# Patient Record
Sex: MALE | Race: BLACK OR AFRICAN AMERICAN | NOT HISPANIC OR LATINO | URBAN - METROPOLITAN AREA
[De-identification: names, ages, dates, MRNs, and addresses within clinical notes are randomized per-mention and may not be internally consistent; named-entity substitution may affect disease eponyms.]

---

## 2017-09-19 ENCOUNTER — EMERGENCY (EMERGENCY)
Facility: HOSPITAL | Age: 47
LOS: 1 days | Discharge: HOME | End: 2017-09-19
Attending: EMERGENCY MEDICINE | Admitting: EMERGENCY MEDICINE
Payer: COMMERCIAL

## 2017-09-19 VITALS
WEIGHT: 229.94 LBS | SYSTOLIC BLOOD PRESSURE: 157 MMHG | RESPIRATION RATE: 16 BRPM | OXYGEN SATURATION: 96 % | TEMPERATURE: 98 F | HEIGHT: 66 IN | DIASTOLIC BLOOD PRESSURE: 100 MMHG | HEART RATE: 92 BPM

## 2017-09-19 VITALS
SYSTOLIC BLOOD PRESSURE: 154 MMHG | OXYGEN SATURATION: 97 % | HEART RATE: 82 BPM | RESPIRATION RATE: 18 BRPM | DIASTOLIC BLOOD PRESSURE: 101 MMHG | TEMPERATURE: 98 F

## 2017-09-19 DIAGNOSIS — R07.89 OTHER CHEST PAIN: ICD-10-CM

## 2017-09-19 DIAGNOSIS — Z79.02 LONG TERM (CURRENT) USE OF ANTITHROMBOTICS/ANTIPLATELETS: ICD-10-CM

## 2017-09-19 DIAGNOSIS — Z79.4 LONG TERM (CURRENT) USE OF INSULIN: ICD-10-CM

## 2017-09-19 DIAGNOSIS — I10 ESSENTIAL (PRIMARY) HYPERTENSION: ICD-10-CM

## 2017-09-19 DIAGNOSIS — E11.65 TYPE 2 DIABETES MELLITUS WITH HYPERGLYCEMIA: ICD-10-CM

## 2017-09-19 DIAGNOSIS — Z79.899 OTHER LONG TERM (CURRENT) DRUG THERAPY: ICD-10-CM

## 2017-09-19 DIAGNOSIS — Z98.61 CORONARY ANGIOPLASTY STATUS: Chronic | ICD-10-CM

## 2017-09-19 DIAGNOSIS — E78.00 PURE HYPERCHOLESTEROLEMIA, UNSPECIFIED: ICD-10-CM

## 2017-09-19 LAB
ALBUMIN SERPL ELPH-MCNC: 4.3 G/DL — SIGNIFICANT CHANGE UP (ref 3.3–5)
ALP SERPL-CCNC: 108 U/L — SIGNIFICANT CHANGE UP (ref 40–120)
ALT FLD-CCNC: 78 U/L — HIGH (ref 10–45)
ANION GAP SERPL CALC-SCNC: 16 MMOL/L — SIGNIFICANT CHANGE UP (ref 5–17)
APTT BLD: 29.4 SEC — SIGNIFICANT CHANGE UP (ref 27.5–37.4)
AST SERPL-CCNC: 49 U/L — HIGH (ref 10–40)
B-OH-BUTYR SERPL-SCNC: 0.2 MMOL/L — SIGNIFICANT CHANGE UP
BASE EXCESS BLDV CALC-SCNC: -0.8 MMOL/L — SIGNIFICANT CHANGE UP
BASOPHILS NFR BLD AUTO: 0.1 % — SIGNIFICANT CHANGE UP (ref 0–2)
BILIRUB SERPL-MCNC: 0.6 MG/DL — SIGNIFICANT CHANGE UP (ref 0.2–1.2)
BUN SERPL-MCNC: 19 MG/DL — SIGNIFICANT CHANGE UP (ref 7–23)
CALCIUM SERPL-MCNC: 10.1 MG/DL — SIGNIFICANT CHANGE UP (ref 8.4–10.5)
CHLORIDE SERPL-SCNC: 95 MMOL/L — LOW (ref 96–108)
CK MB CFR SERPL CALC: 2.8 NG/ML — SIGNIFICANT CHANGE UP (ref 0–6.7)
CO2 SERPL-SCNC: 21 MMOL/L — LOW (ref 22–31)
CREAT SERPL-MCNC: 1.05 MG/DL — SIGNIFICANT CHANGE UP (ref 0.5–1.3)
EOSINOPHIL NFR BLD AUTO: 0.7 % — SIGNIFICANT CHANGE UP (ref 0–6)
GAS PNL BLDV: SIGNIFICANT CHANGE UP
GLUCOSE SERPL-MCNC: 459 MG/DL — CRITICAL HIGH (ref 70–99)
HCO3 BLDV-SCNC: 25 MMOL/L — SIGNIFICANT CHANGE UP (ref 20–27)
HCT VFR BLD CALC: 45.6 % — SIGNIFICANT CHANGE UP (ref 39–50)
HGB BLD-MCNC: 15.8 G/DL — SIGNIFICANT CHANGE UP (ref 13–17)
INR BLD: 0.88 — SIGNIFICANT CHANGE UP (ref 0.88–1.16)
LYMPHOCYTES # BLD AUTO: 36 % — SIGNIFICANT CHANGE UP (ref 13–44)
MCHC RBC-ENTMCNC: 30.4 PG — SIGNIFICANT CHANGE UP (ref 27–34)
MCHC RBC-ENTMCNC: 34.6 G/DL — SIGNIFICANT CHANGE UP (ref 32–36)
MCV RBC AUTO: 87.7 FL — SIGNIFICANT CHANGE UP (ref 80–100)
MONOCYTES NFR BLD AUTO: 5.5 % — SIGNIFICANT CHANGE UP (ref 2–14)
NEUTROPHILS NFR BLD AUTO: 57.7 % — SIGNIFICANT CHANGE UP (ref 43–77)
PCO2 BLDV: 46 MMHG — SIGNIFICANT CHANGE UP (ref 41–51)
PH BLDV: 7.36 — SIGNIFICANT CHANGE UP (ref 7.32–7.43)
PLATELET # BLD AUTO: 209 K/UL — SIGNIFICANT CHANGE UP (ref 150–400)
PO2 BLDV: 33 MMHG — SIGNIFICANT CHANGE UP
POTASSIUM SERPL-MCNC: 5.1 MMOL/L — SIGNIFICANT CHANGE UP (ref 3.5–5.3)
POTASSIUM SERPL-SCNC: 5.1 MMOL/L — SIGNIFICANT CHANGE UP (ref 3.5–5.3)
PROT SERPL-MCNC: 7.9 G/DL — SIGNIFICANT CHANGE UP (ref 6–8.3)
PROTHROM AB SERPL-ACNC: 9.7 SEC — LOW (ref 9.8–12.7)
RBC # BLD: 5.2 M/UL — SIGNIFICANT CHANGE UP (ref 4.2–5.8)
RBC # FLD: 12.9 % — SIGNIFICANT CHANGE UP (ref 10.3–16.9)
SAO2 % BLDV: 58 % — SIGNIFICANT CHANGE UP
SODIUM SERPL-SCNC: 132 MMOL/L — LOW (ref 135–145)
TROPONIN T SERPL-MCNC: <0.01 NG/ML — SIGNIFICANT CHANGE UP (ref 0–0.01)
WBC # BLD: 6.7 K/UL — SIGNIFICANT CHANGE UP (ref 3.8–10.5)
WBC # FLD AUTO: 6.7 K/UL — SIGNIFICANT CHANGE UP (ref 3.8–10.5)

## 2017-09-19 PROCEDURE — 99284 EMERGENCY DEPT VISIT MOD MDM: CPT | Mod: 25

## 2017-09-19 PROCEDURE — 71020: CPT | Mod: 26

## 2017-09-19 PROCEDURE — 85025 COMPLETE CBC W/AUTO DIFF WBC: CPT

## 2017-09-19 PROCEDURE — 96374 THER/PROPH/DIAG INJ IV PUSH: CPT

## 2017-09-19 PROCEDURE — 84484 ASSAY OF TROPONIN QUANT: CPT

## 2017-09-19 PROCEDURE — 85730 THROMBOPLASTIN TIME PARTIAL: CPT

## 2017-09-19 PROCEDURE — 82803 BLOOD GASES ANY COMBINATION: CPT

## 2017-09-19 PROCEDURE — 85610 PROTHROMBIN TIME: CPT

## 2017-09-19 PROCEDURE — 82553 CREATINE MB FRACTION: CPT

## 2017-09-19 PROCEDURE — 82010 KETONE BODYS QUAN: CPT

## 2017-09-19 PROCEDURE — 85379 FIBRIN DEGRADATION QUANT: CPT

## 2017-09-19 PROCEDURE — 99285 EMERGENCY DEPT VISIT HI MDM: CPT | Mod: 25

## 2017-09-19 PROCEDURE — 36415 COLL VENOUS BLD VENIPUNCTURE: CPT

## 2017-09-19 PROCEDURE — 82550 ASSAY OF CK (CPK): CPT

## 2017-09-19 PROCEDURE — 71046 X-RAY EXAM CHEST 2 VIEWS: CPT

## 2017-09-19 PROCEDURE — 80053 COMPREHEN METABOLIC PANEL: CPT

## 2017-09-19 PROCEDURE — 93010 ELECTROCARDIOGRAM REPORT: CPT

## 2017-09-19 PROCEDURE — 93005 ELECTROCARDIOGRAM TRACING: CPT

## 2017-09-19 RX ORDER — SODIUM CHLORIDE 9 MG/ML
1000 INJECTION INTRAMUSCULAR; INTRAVENOUS; SUBCUTANEOUS ONCE
Qty: 0 | Refills: 0 | Status: COMPLETED | OUTPATIENT
Start: 2017-09-19 | End: 2017-09-19

## 2017-09-19 RX ORDER — INSULIN HUMAN 100 [IU]/ML
8 INJECTION, SOLUTION SUBCUTANEOUS ONCE
Qty: 0 | Refills: 0 | Status: COMPLETED | OUTPATIENT
Start: 2017-09-19 | End: 2017-09-19

## 2017-09-19 RX ORDER — ASPIRIN/CALCIUM CARB/MAGNESIUM 324 MG
325 TABLET ORAL ONCE
Qty: 0 | Refills: 0 | Status: COMPLETED | OUTPATIENT
Start: 2017-09-19 | End: 2017-09-19

## 2017-09-19 RX ORDER — METOPROLOL TARTRATE 50 MG
25 TABLET ORAL ONCE
Qty: 0 | Refills: 0 | Status: COMPLETED | OUTPATIENT
Start: 2017-09-19 | End: 2017-09-19

## 2017-09-19 RX ADMIN — INSULIN HUMAN 8 UNIT(S): 100 INJECTION, SOLUTION SUBCUTANEOUS at 10:50

## 2017-09-19 RX ADMIN — Medication 25 MILLIGRAM(S): at 09:41

## 2017-09-19 RX ADMIN — SODIUM CHLORIDE 2000 MILLILITER(S): 9 INJECTION INTRAMUSCULAR; INTRAVENOUS; SUBCUTANEOUS at 11:57

## 2017-09-19 RX ADMIN — SODIUM CHLORIDE 2000 MILLILITER(S): 9 INJECTION INTRAMUSCULAR; INTRAVENOUS; SUBCUTANEOUS at 10:50

## 2017-09-19 RX ADMIN — Medication 325 MILLIGRAM(S): at 09:41

## 2017-09-19 NOTE — ED PROVIDER NOTE - MEDICAL DECISION MAKING DETAILS
Pt w h/o cad s/p nstemi, stents, + fh cad and pe/dvt c/o R sided cp.  Sx intermittent x 1 yr, pleuritic today.  EKG w/o stemi.  Sx seem atypical for acs but ? acs.  + pe risk (travel, fh) but no sob, tachypnea, ? costochondritis or plueritis given recent uri, no radiation to back, sx x 1 yr - doubt dissection despite elevated bp today.  Plan labs, cxr, reassess.  Pt cp free in ed.

## 2017-09-19 NOTE — ED ADULT TRIAGE NOTE - OTHER COMPLAINTS
pt c.o R sided cp, intermittent, no radiation x 1 year. hx cardiac stents 2015. denies sob. cp began this am. ekg in progress.

## 2017-09-19 NOTE — ED PROVIDER NOTE - DIAGNOSTIC INTERPRETATION
ER Physician:  Bren Director  CHEST XRAY INTERPRETATION: lungs clear, heart shadow normal, bony structures intact

## 2017-09-19 NOTE — ED PROVIDER NOTE - PMH
DM (diabetes mellitus)    HTN (hypertension)    Hypercholesteremia    NSTEMI (non-ST elevated myocardial infarction)    Stented coronary artery  x 8

## 2017-09-19 NOTE — ED PROVIDER NOTE - CARDIAC, MLM
Normal rate, regular rhythm.  Heart sounds S1, S2.  No murmurs, rubs or gallops. + R cw and costosternal area ttp that reproduces cc

## 2017-09-19 NOTE — ED PROVIDER NOTE - OBJECTIVE STATEMENT
44 y/o M w/ Strong FMHX of CAD/MI, Ex-Smoker (quit in 3/2016), PMHX of HTN, DM, h/o multiple syncope episodes between 7238-4184, CAD s/p NSTEMI at [facility] in 3/2016 44 y/o M w/ Strong FMHX of CAD/MI, Ex-Smoker (quit in 3/2016), PMHX of HTN, DM, h/o multiple syncope episodes between 9897-3757, CAD s/p NSTEMI at [facility] in 3/2016, s/p cath w mult stents 3/16 c/o R sided cp x 1 yr intermittently, woke w pain this am at 530, intermittent pain - felt w deep inspiration until 8 am, now gone.  No associated sob, palpitations, n, dizziness, + diaphoresis, no le pain/swelling, + recent car trip to [place], + fh cad and pe/dvt, no personal h/o pe/dvt, + recent uri sx last week w slight cough/fever now resolved.  Pain "feels like a hole" and is different than prev mi related pain (indigestion sensation, not present today, no abd pain, n/v).  Pain 7/10 when present.  Pt reports he took his meds, including bp meds, today.  No radiation to back or arm.  No recent lift/strain, associated numbness/weakness in ext, ha.  Pt reports nl stress test within the past yr, no recent exertional cp/sob.

## 2017-09-19 NOTE — ED PROVIDER NOTE - MUSCULOSKELETAL, MLM
bilat le w/o ttp, c/c/e, cord; Spine appears normal, range of motion is not limited, no muscle or joint tenderness

## 2017-09-19 NOTE — ED PROVIDER NOTE - PROGRESS NOTE DETAILS
Pt discussed w his cardiologist, Dr Pelayo - clemente ramirez for cardiac, he can fu w Dr Pelayo in the office on Fri.  Pt's glu elevated (did not take his Victoza today) - will give insulin and ivf, vbg w nl pH, Beta hydroxybuturate pending.  Plan on dc if glu improved.

## 2017-11-16 VITALS
WEIGHT: 225.97 LBS | HEIGHT: 66 IN | HEART RATE: 80 BPM | SYSTOLIC BLOOD PRESSURE: 151 MMHG | DIASTOLIC BLOOD PRESSURE: 101 MMHG | OXYGEN SATURATION: 99 % | TEMPERATURE: 98 F | RESPIRATION RATE: 16 BRPM

## 2017-11-16 NOTE — H&P ADULT - NSHPLABSRESULTS_GEN_ALL_CORE
15.7   8.6   )-----------( 216      ( 17 Nov 2017 14:30 )             44.4 15.7   8.6   )-----------( 216      ( 17 Nov 2017 14:30 )             44.4  11-17    139  |  98  |  12  ----------------------------<  215<H>  4.0   |  23  |  1.01    Ca    9.8      17 Nov 2017 14:30    TPro  7.9  /  Alb  4.6  /  TBili  0.6  /  DBili  <0.2  /  AST  24  /  ALT  48<H>  /  AlkPhos  86  11-17  PT/INR - ( 17 Nov 2017 14:30 )   PT: 10.6 sec;   INR: 0.96          PTT - ( 17 Nov 2017 14:30 )  PTT:29.6 sec

## 2017-11-16 NOTE — H&P ADULT - FAMILY HISTORY
Father  Still living? No  Family history of acute myocardial infarction, Age at diagnosis: 41-50     Sibling  Still living? Unknown  Family history of acute myocardial infarction, Age at diagnosis: 41-50     Aunt  Still living? Unknown  Family history of acute myocardial infarction, Age at diagnosis: Age Unknown     Uncle  Still living? Unknown  Family history of acute myocardial infarction, Age at diagnosis: Age Unknown

## 2017-11-16 NOTE — H&P ADULT - HISTORY OF PRESENT ILLNESS
SKELETON    Patient is a 46yo M, former smoker, wtih Strong FHx of CAD/MI and PMHX of HTN, IDDM, h/o multiple syncope episodes between 8740-2490, CAD s/p NSTEMI 3/2016 tx'd PCI LAD who presented to his cardiologist's office complaining of pressure-like left sided CP radiating to the left arm and jaw with associated SOB on exertion, relieved with rest. Patient denies palpitations, orthopnea, PND, N/V, hematochezia, melena, LE edema, dizziness, syncope. Patient underwent stress echo which was abnormal as per Dr. Pelayo's note (report pending). In light of patient's risk factors and class III anginal symptoms, patient is now referred to [facility] for recommended cardiac catheterization with possible intervention. **PLEASE CLARIFY SMOKING HISTORY***    46yo M, former smoker, with Strong FHx of CAD/MI and PMHX of HTN, IDDM, h/o multiple syncope episodes between 9925-2778 (none for past 5 years), CAD s/p NSTEMI 3/2016 tx'd PCI LAD who presented to his cardiologist's office complaining of  intermittent CP over past year that has worsened over past 2.5 weeks. Worsening CP described as continuous right sided chest pain radiating down right arm and of 8/10 intensity occurring independent of activity.  Muscle relaxers and Motrin prescribed 4 days ago and CP has since resolved. Furthermore, he endorses orthopnea (sleeps with 2 pillows) and PND since previous NSTEMI 3/2016.  Patient denies palpitations,  N/V,  LE edema, dizziness, recent episodes syncope. Patient underwent stress echo which was abnormal as per Dr. Pelayo's note (report pending). In light of patient's risk factors and class III anginal symptoms, patient is now referred to [facility] for recommended cardiac catheterization with possible intervention. 46yo M, former smoker, with Strong FHx of CAD/MI and PMHX of HTN, IDDM, h/o multiple syncope episodes between 7436-2314 (none for past 5 years), CAD s/p NSTEMI 3/2016 tx'd PCI LAD who presented to his cardiologist's office complaining of  intermittent CP over past year that has worsened over past 2.5 weeks. Worsening CP described as continuous right sided chest pain radiating down right arm and of 8/10 intensity occurring independent of activity.  Muscle relaxers and Motrin prescribed 4 days ago and CP has since resolved. Furthermore, he endorses orthopnea (sleeps with 2 pillows) and PND since previous NSTEMI 3/2016.  Patient denies palpitations,  N/V,  LE edema, dizziness, recent episodes syncope. Patient underwent stress echo 10/21/17 which revealed low- normal LVSF, LVEF 50-55 %; hypokinetic apical lateral LV wall motion, trace MR, trace TR and the test was stopped because of leg fatigue. In light of patient's risk factors and class III anginal symptoms, patient is now referred to [facility] for recommended cardiac catheterization with possible intervention.

## 2017-11-16 NOTE — H&P ADULT - ASSESSMENT
46yo M, former smoker, with Strong FHx of CAD/MI and PMHX of HTN, IDDM, h/o multiple syncope episodes between 4773-3858 (none for past 5 years), CAD s/p NSTEMI 3/2016 tx'd PCI LAD who presents for recommended cardiac catheterization with possible intervention secondary to patient's risk factors and class III anginal symptoms     mg X 1 and Plavix 75 mg X 1 given pre-cath. Pt. compliant with DAPT.  IV NS @ 75 cc/hr.    Risks & benefits of procedure and alternative therapy have been explained to the patient including but not limited to: allergic reaction, bleeding w/possible need for blood transfusion, infection, renal and vascular compromise, limb damage, arrhythmia, stroke, vessel dissection/perforation, Myocardial infarction, emergent CABG. Informed consent obtained and in chart. 44yo M, former smoker, with Strong FHx of CAD/MI and PMHX of HTN, IDDM, h/o multiple syncope episodes between 0213-5211 (none for past 5 years), CAD s/p NSTEMI 3/2016 tx'd PCI LAD who presents for recommended cardiac catheterization with possible intervention secondary to patient's risk factors and class III anginal symptoms     mg X 1 and Plavix 75 mg X 1 given pre-cath. Pt. compliant with DAPT.  IV NS @ 75 cc/hr.  Pt. BP on arrival 175/101. Current /101. Pt. given Metoprolol succinate 25 mg PO X 1.    Risks & benefits of procedure and alternative therapy have been explained to the patient including but not limited to: allergic reaction, bleeding w/possible need for blood transfusion, infection, renal and vascular compromise, limb damage, arrhythmia, stroke, vessel dissection/perforation, Myocardial infarction, emergent CABG. Informed consent obtained and in chart. 44yo M, former smoker, with Strong FHx of CAD/MI and PMHX of HTN, IDDM, h/o multiple syncope episodes between 9280-1898 (none for past 5 years), CAD s/p NSTEMI 3/2016 tx'd PCI LAD who presents for recommended cardiac catheterization with possible intervention secondary to patient's risk factors and class III anginal symptoms     mg X 1 and Plavix 75 mg X 1 given pre-cath. Pt. compliant with DAPT.  IV NS @ 75 cc/hr.  Pt. BP on arrival 175/101. Current /101. Pt. given Metoprolol succinate 25 mg PO X 1. Current /91.    Risks & benefits of procedure and alternative therapy have been explained to the patient including but not limited to: allergic reaction, bleeding w/possible need for blood transfusion, infection, renal and vascular compromise, limb damage, arrhythmia, stroke, vessel dissection/perforation, Myocardial infarction, emergent CABG. Informed consent obtained and in chart. 44yo M, former smoker, with Strong FHx of CAD/MI and PMHX of HTN, IDDM, h/o multiple syncope episodes between 4273-2202 (none for past 5 years), CAD s/p NSTEMI 3/2016 tx'd PCI LAD who presents for recommended cardiac catheterization with possible intervention secondary to patient's risk factors and class III anginal symptoms     mg X 1 and Plavix 75 mg X 1 given pre-cath. Pt. compliant with DAPT.  IV NS @ 75 cc/hr.  Pt. BP on arrival 175/101. Repeat /101. Pt. given Metoprolol succinate 25 mg PO X 1. Current /91.    Risks & benefits of procedure and alternative therapy have been explained to the patient including but not limited to: allergic reaction, bleeding w/possible need for blood transfusion, infection, renal and vascular compromise, limb damage, arrhythmia, stroke, vessel dissection/perforation, Myocardial infarction, emergent CABG. Informed consent obtained and in chart.

## 2017-11-16 NOTE — H&P ADULT - NSHPSOCIALHISTORY_GEN_ALL_CORE
Former smoker    Denies ETOH/elicit drug use. Former smoker; quit smoking 2 yrs ago; smoked 1 pack on and off for 36 years.    Denies ETOH/elicit drug use.

## 2017-11-16 NOTE — H&P ADULT - RS GEN PE MLT RESP DETAILS PC
normal/no rhonchi/no wheezes/airway patent/breath sounds equal/good air movement/no rales/respirations non-labored/clear to auscultation bilaterally

## 2017-11-17 ENCOUNTER — INPATIENT (INPATIENT)
Facility: HOSPITAL | Age: 47
LOS: 0 days | Discharge: HOME | DRG: 247 | End: 2017-11-18
Attending: INTERNAL MEDICINE | Admitting: INTERNAL MEDICINE
Payer: COMMERCIAL

## 2017-11-17 DIAGNOSIS — Z98.61 CORONARY ANGIOPLASTY STATUS: Chronic | ICD-10-CM

## 2017-11-17 LAB
ALBUMIN SERPL ELPH-MCNC: 4.6 G/DL — SIGNIFICANT CHANGE UP (ref 3.3–5)
ALBUMIN SERPL ELPH-MCNC: 4.6 G/DL — SIGNIFICANT CHANGE UP (ref 3.3–5)
ALP SERPL-CCNC: 86 U/L — SIGNIFICANT CHANGE UP (ref 40–120)
ALP SERPL-CCNC: 86 U/L — SIGNIFICANT CHANGE UP (ref 40–120)
ALT FLD-CCNC: 48 U/L — HIGH (ref 10–45)
ALT FLD-CCNC: 48 U/L — HIGH (ref 10–45)
ANION GAP SERPL CALC-SCNC: 18 MMOL/L — HIGH (ref 5–17)
APTT BLD: 29.6 SEC — SIGNIFICANT CHANGE UP (ref 27.5–37.4)
AST SERPL-CCNC: 24 U/L — SIGNIFICANT CHANGE UP (ref 10–40)
AST SERPL-CCNC: 24 U/L — SIGNIFICANT CHANGE UP (ref 10–40)
BASOPHILS NFR BLD AUTO: 0.2 % — SIGNIFICANT CHANGE UP (ref 0–2)
BILIRUB DIRECT SERPL-MCNC: <0.2 MG/DL — SIGNIFICANT CHANGE UP (ref 0–0.2)
BILIRUB INDIRECT FLD-MCNC: >0.4 MG/DL — SIGNIFICANT CHANGE UP (ref 0.2–1)
BILIRUB SERPL-MCNC: 0.6 MG/DL — SIGNIFICANT CHANGE UP (ref 0.2–1.2)
BILIRUB SERPL-MCNC: 0.6 MG/DL — SIGNIFICANT CHANGE UP (ref 0.2–1.2)
BUN SERPL-MCNC: 12 MG/DL — SIGNIFICANT CHANGE UP (ref 7–23)
CALCIUM SERPL-MCNC: 9.8 MG/DL — SIGNIFICANT CHANGE UP (ref 8.4–10.5)
CHLORIDE SERPL-SCNC: 98 MMOL/L — SIGNIFICANT CHANGE UP (ref 96–108)
CHOLEST SERPL-MCNC: 98 MG/DL — SIGNIFICANT CHANGE UP (ref 10–199)
CK MB CFR SERPL CALC: 2.7 NG/ML — SIGNIFICANT CHANGE UP (ref 0–6.7)
CO2 SERPL-SCNC: 23 MMOL/L — SIGNIFICANT CHANGE UP (ref 22–31)
CREAT SERPL-MCNC: 1.01 MG/DL — SIGNIFICANT CHANGE UP (ref 0.5–1.3)
EOSINOPHIL NFR BLD AUTO: 0.5 % — SIGNIFICANT CHANGE UP (ref 0–6)
GLUCOSE BLDC GLUCOMTR-MCNC: 158 MG/DL — HIGH (ref 70–99)
GLUCOSE BLDC GLUCOMTR-MCNC: 284 MG/DL — HIGH (ref 70–99)
GLUCOSE SERPL-MCNC: 215 MG/DL — HIGH (ref 70–99)
HBA1C BLD-MCNC: 10.7 % — HIGH (ref 4–5.6)
HCT VFR BLD CALC: 44.4 % — SIGNIFICANT CHANGE UP (ref 39–50)
HDLC SERPL-MCNC: 36 MG/DL — LOW (ref 40–125)
HGB BLD-MCNC: 15.7 G/DL — SIGNIFICANT CHANGE UP (ref 13–17)
INR BLD: 0.96 — SIGNIFICANT CHANGE UP (ref 0.88–1.16)
LIPID PNL WITH DIRECT LDL SERPL: 38 MG/DL — SIGNIFICANT CHANGE UP
LYMPHOCYTES # BLD AUTO: 38.3 % — SIGNIFICANT CHANGE UP (ref 13–44)
MCHC RBC-ENTMCNC: 30.8 PG — SIGNIFICANT CHANGE UP (ref 27–34)
MCHC RBC-ENTMCNC: 35.4 G/DL — SIGNIFICANT CHANGE UP (ref 32–36)
MCV RBC AUTO: 87.1 FL — SIGNIFICANT CHANGE UP (ref 80–100)
MONOCYTES NFR BLD AUTO: 5.1 % — SIGNIFICANT CHANGE UP (ref 2–14)
NEUTROPHILS NFR BLD AUTO: 55.9 % — SIGNIFICANT CHANGE UP (ref 43–77)
PLATELET # BLD AUTO: 216 K/UL — SIGNIFICANT CHANGE UP (ref 150–400)
POTASSIUM SERPL-MCNC: 4 MMOL/L — SIGNIFICANT CHANGE UP (ref 3.5–5.3)
POTASSIUM SERPL-SCNC: 4 MMOL/L — SIGNIFICANT CHANGE UP (ref 3.5–5.3)
PROT SERPL-MCNC: 7.9 G/DL — SIGNIFICANT CHANGE UP (ref 6–8.3)
PROT SERPL-MCNC: 7.9 G/DL — SIGNIFICANT CHANGE UP (ref 6–8.3)
PROTHROM AB SERPL-ACNC: 10.6 SEC — SIGNIFICANT CHANGE UP (ref 9.8–12.7)
RBC # BLD: 5.1 M/UL — SIGNIFICANT CHANGE UP (ref 4.2–5.8)
RBC # FLD: 13 % — SIGNIFICANT CHANGE UP (ref 10.3–16.9)
SODIUM SERPL-SCNC: 139 MMOL/L — SIGNIFICANT CHANGE UP (ref 135–145)
TOTAL CHOLESTEROL/HDL RATIO MEASUREMENT: 2.7 RATIO — LOW (ref 3.4–9.6)
TRIGL SERPL-MCNC: 120 MG/DL — SIGNIFICANT CHANGE UP (ref 10–149)
WBC # BLD: 8.6 K/UL — SIGNIFICANT CHANGE UP (ref 3.8–10.5)
WBC # FLD AUTO: 8.6 K/UL — SIGNIFICANT CHANGE UP (ref 3.8–10.5)

## 2017-11-17 PROCEDURE — 99222 1ST HOSP IP/OBS MODERATE 55: CPT | Mod: 25

## 2017-11-17 PROCEDURE — 93458 L HRT ARTERY/VENTRICLE ANGIO: CPT | Mod: 26,XU

## 2017-11-17 PROCEDURE — 93010 ELECTROCARDIOGRAM REPORT: CPT

## 2017-11-17 PROCEDURE — 92978 ENDOLUMINL IVUS OCT C 1ST: CPT | Mod: 26,LD

## 2017-11-17 PROCEDURE — 92933 PRQ TRLML C ATHRC ST ANGIOP1: CPT | Mod: LD

## 2017-11-17 RX ORDER — SODIUM CHLORIDE 9 MG/ML
1000 INJECTION, SOLUTION INTRAVENOUS
Qty: 0 | Refills: 0 | Status: DISCONTINUED | OUTPATIENT
Start: 2017-11-17 | End: 2017-11-17

## 2017-11-17 RX ORDER — CLOPIDOGREL BISULFATE 75 MG/1
75 TABLET, FILM COATED ORAL ONCE
Qty: 0 | Refills: 0 | Status: COMPLETED | OUTPATIENT
Start: 2017-11-17 | End: 2017-11-17

## 2017-11-17 RX ORDER — DEXTROSE 50 % IN WATER 50 %
1 SYRINGE (ML) INTRAVENOUS ONCE
Qty: 0 | Refills: 0 | Status: DISCONTINUED | OUTPATIENT
Start: 2017-11-17 | End: 2017-11-17

## 2017-11-17 RX ORDER — DEXTROSE 50 % IN WATER 50 %
1 SYRINGE (ML) INTRAVENOUS ONCE
Qty: 0 | Refills: 0 | Status: DISCONTINUED | OUTPATIENT
Start: 2017-11-17 | End: 2017-11-18

## 2017-11-17 RX ORDER — GLUCAGON INJECTION, SOLUTION 0.5 MG/.1ML
1 INJECTION, SOLUTION SUBCUTANEOUS ONCE
Qty: 0 | Refills: 0 | Status: DISCONTINUED | OUTPATIENT
Start: 2017-11-17 | End: 2017-11-18

## 2017-11-17 RX ORDER — DEXTROSE 50 % IN WATER 50 %
12.5 SYRINGE (ML) INTRAVENOUS ONCE
Qty: 0 | Refills: 0 | Status: DISCONTINUED | OUTPATIENT
Start: 2017-11-17 | End: 2017-11-17

## 2017-11-17 RX ORDER — INSULIN LISPRO 100/ML
VIAL (ML) SUBCUTANEOUS
Qty: 0 | Refills: 0 | Status: DISCONTINUED | OUTPATIENT
Start: 2017-11-17 | End: 2017-11-18

## 2017-11-17 RX ORDER — CHLORHEXIDINE GLUCONATE 213 G/1000ML
1 SOLUTION TOPICAL ONCE
Qty: 0 | Refills: 0 | Status: DISCONTINUED | OUTPATIENT
Start: 2017-11-17 | End: 2017-11-17

## 2017-11-17 RX ORDER — DEXTROSE 50 % IN WATER 50 %
25 SYRINGE (ML) INTRAVENOUS ONCE
Qty: 0 | Refills: 0 | Status: DISCONTINUED | OUTPATIENT
Start: 2017-11-17 | End: 2017-11-17

## 2017-11-17 RX ORDER — SODIUM CHLORIDE 9 MG/ML
1000 INJECTION, SOLUTION INTRAVENOUS
Qty: 0 | Refills: 0 | Status: DISCONTINUED | OUTPATIENT
Start: 2017-11-17 | End: 2017-11-18

## 2017-11-17 RX ORDER — DEXTROSE 50 % IN WATER 50 %
25 SYRINGE (ML) INTRAVENOUS ONCE
Qty: 0 | Refills: 0 | Status: DISCONTINUED | OUTPATIENT
Start: 2017-11-17 | End: 2017-11-18

## 2017-11-17 RX ORDER — ASPIRIN/CALCIUM CARB/MAGNESIUM 324 MG
325 TABLET ORAL ONCE
Qty: 0 | Refills: 0 | Status: COMPLETED | OUTPATIENT
Start: 2017-11-17 | End: 2017-11-17

## 2017-11-17 RX ORDER — ATORVASTATIN CALCIUM 80 MG/1
40 TABLET, FILM COATED ORAL AT BEDTIME
Qty: 0 | Refills: 0 | Status: DISCONTINUED | OUTPATIENT
Start: 2017-11-17 | End: 2017-11-18

## 2017-11-17 RX ORDER — DEXTROSE 50 % IN WATER 50 %
12.5 SYRINGE (ML) INTRAVENOUS ONCE
Qty: 0 | Refills: 0 | Status: DISCONTINUED | OUTPATIENT
Start: 2017-11-17 | End: 2017-11-18

## 2017-11-17 RX ORDER — ASPIRIN/CALCIUM CARB/MAGNESIUM 324 MG
81 TABLET ORAL DAILY
Qty: 0 | Refills: 0 | Status: DISCONTINUED | OUTPATIENT
Start: 2017-11-18 | End: 2017-11-18

## 2017-11-17 RX ORDER — INSULIN LISPRO 100/ML
VIAL (ML) SUBCUTANEOUS ONCE
Qty: 0 | Refills: 0 | Status: DISCONTINUED | OUTPATIENT
Start: 2017-11-17 | End: 2017-11-17

## 2017-11-17 RX ORDER — CLOPIDOGREL BISULFATE 75 MG/1
75 TABLET, FILM COATED ORAL DAILY
Qty: 0 | Refills: 0 | Status: DISCONTINUED | OUTPATIENT
Start: 2017-11-18 | End: 2017-11-18

## 2017-11-17 RX ORDER — INSULIN GLARGINE 100 [IU]/ML
25 INJECTION, SOLUTION SUBCUTANEOUS AT BEDTIME
Qty: 0 | Refills: 0 | Status: DISCONTINUED | OUTPATIENT
Start: 2017-11-17 | End: 2017-11-18

## 2017-11-17 RX ORDER — GLUCAGON INJECTION, SOLUTION 0.5 MG/.1ML
1 INJECTION, SOLUTION SUBCUTANEOUS ONCE
Qty: 0 | Refills: 0 | Status: DISCONTINUED | OUTPATIENT
Start: 2017-11-17 | End: 2017-11-17

## 2017-11-17 RX ORDER — INSULIN GLARGINE 100 [IU]/ML
25 INJECTION, SOLUTION SUBCUTANEOUS ONCE
Qty: 0 | Refills: 0 | Status: DISCONTINUED | OUTPATIENT
Start: 2017-11-17 | End: 2017-11-17

## 2017-11-17 RX ORDER — METOPROLOL TARTRATE 50 MG
25 TABLET ORAL ONCE
Qty: 0 | Refills: 0 | Status: COMPLETED | OUTPATIENT
Start: 2017-11-17 | End: 2017-11-17

## 2017-11-17 RX ORDER — METOPROLOL TARTRATE 50 MG
25 TABLET ORAL DAILY
Qty: 0 | Refills: 0 | Status: DISCONTINUED | OUTPATIENT
Start: 2017-11-17 | End: 2017-11-18

## 2017-11-17 RX ORDER — INSULIN LISPRO 100/ML
VIAL (ML) SUBCUTANEOUS
Qty: 0 | Refills: 0 | Status: DISCONTINUED | OUTPATIENT
Start: 2017-11-17 | End: 2017-11-17

## 2017-11-17 RX ORDER — SODIUM CHLORIDE 9 MG/ML
500 INJECTION INTRAMUSCULAR; INTRAVENOUS; SUBCUTANEOUS
Qty: 0 | Refills: 0 | Status: DISCONTINUED | OUTPATIENT
Start: 2017-11-17 | End: 2017-11-17

## 2017-11-17 RX ADMIN — CLOPIDOGREL BISULFATE 75 MILLIGRAM(S): 75 TABLET, FILM COATED ORAL at 15:43

## 2017-11-17 RX ADMIN — Medication 325 MILLIGRAM(S): at 15:43

## 2017-11-17 RX ADMIN — ATORVASTATIN CALCIUM 40 MILLIGRAM(S): 80 TABLET, FILM COATED ORAL at 22:26

## 2017-11-17 RX ADMIN — Medication 25 MILLIGRAM(S): at 15:44

## 2017-11-17 RX ADMIN — INSULIN GLARGINE 25 UNIT(S): 100 INJECTION, SOLUTION SUBCUTANEOUS at 22:26

## 2017-11-17 RX ADMIN — Medication 6: at 22:26

## 2017-11-17 RX ADMIN — SODIUM CHLORIDE 75 MILLILITER(S): 9 INJECTION INTRAMUSCULAR; INTRAVENOUS; SUBCUTANEOUS at 15:44

## 2017-11-18 VITALS — TEMPERATURE: 97 F

## 2017-11-18 LAB — GLUCOSE BLDC GLUCOMTR-MCNC: 192 MG/DL — HIGH (ref 70–99)

## 2017-11-18 PROCEDURE — 99239 HOSP IP/OBS DSCHRG MGMT >30: CPT

## 2017-11-18 RX ORDER — ASPIRIN/CALCIUM CARB/MAGNESIUM 324 MG
1 TABLET ORAL
Qty: 30 | Refills: 11 | OUTPATIENT
Start: 2017-11-18 | End: 2018-11-12

## 2017-11-18 RX ORDER — CLOPIDOGREL BISULFATE 75 MG/1
1 TABLET, FILM COATED ORAL
Qty: 30 | Refills: 11 | OUTPATIENT
Start: 2017-11-18 | End: 2018-11-12

## 2017-11-18 RX ADMIN — Medication 25 MILLIGRAM(S): at 06:41

## 2017-11-18 RX ADMIN — Medication 2: at 06:41

## 2017-11-18 NOTE — DISCHARGE NOTE ADULT - ADDITIONAL INSTRUCTIONS
Please see Dr Pelayo in 1-2 weeks for a check up. Please return in 4-6 weeks for another stent in one of your blocked arteries

## 2017-11-18 NOTE — DISCHARGE NOTE ADULT - HOSPITAL COURSE
44yo M, former smoker, with Strong FHx of CAD/MI and PMHX of HTN, IDDM, h/o multiple syncope episodes between 4020-7623 (none for past 5 years), CAD s/p NSTEMI 3/2016 tx'd PCI LAD who presented to his cardiologist's office complaining of  intermittent CP over past year that has worsened over past 2.5 weeks. Worsening CP described as continuous right sided chest pain radiating down right arm and of 8/10 intensity occurring independent of activity.  Muscle relaxers and Motrin prescribed 4 days ago and CP has since resolved. Furthermore, he endorses orthopnea (sleeps with 2 pillows) and PND since previous NSTEMI 3/2016.  Patient denies palpitations,  N/V,  LE edema, dizziness, recent episodes syncope. Patient underwent stress echo 10/21/17 which revealed low- normal LVSF, LVEF 50-55 %; hypokinetic apical lateral LV wall motion, trace MR, trace TR and the test was stopped because of leg fatigue. In light of patient's risk factors and class III anginal symptoms, patient is now referred to [facility] for recommended cardiac catheterization with possible intervention. CATH 11/17: Atherectomy/RENU-->mLAD,75% mRCA ISR, EF:60% Right radial@8:30PM. Patient was about to leave the hospital AMA at 750 AM, no events on telemetry, Labs have not results yet, but pateint does not want to wait for them because he will be homeless on the 30th of this month and has to get into a homeless shelter in [place] that he is late to. Medications reviewed, patients meds sent to pharmacy, physical exam WNL, access siste stable strong distal pulse intact, no bleeding no hematoma.     Case d/w Dr Cisse 46yo M, former smoker, with Strong FHx of CAD/MI and PMHX of HTN, IDDM, h/o multiple syncope episodes between 0806-9113 (none for past 5 years), CAD s/p NSTEMI 3/2016 tx'd PCI LAD who presented to his cardiologist's office complaining of  intermittent CP over past year that has worsened over past 2.5 weeks. Worsening CP described as continuous right sided chest pain radiating down right arm and of 8/10 intensity occurring independent of activity.  Muscle relaxers and Motrin prescribed 4 days ago and CP has since resolved. Furthermore, he endorses orthopnea (sleeps with 2 pillows) and PND since previous NSTEMI 3/2016.  Patient denies palpitations,  N/V,  LE edema, dizziness, recent episodes syncope. Patient underwent stress echo 10/21/17 which revealed low- normal LVSF, LVEF 50-55 %; hypokinetic apical lateral LV wall motion, trace MR, trace TR and the test was stopped because of leg fatigue. In light of patient's risk factors and class III anginal symptoms, patient is now referred to [facility] for recommended cardiac catheterization with possible intervention. CATH 11/17: Atherectomy/RENU-->mLAD,75% mRCA ISR, EF:60% Right radial@8:30PM. Patient was about to leave the hospital AMA at 750 AM, no events on telemetry, Labs have not results yet, but pateint does not want to wait for them because he will be homeless on the 30th of this month and has to get into a homeless shelter in [place] that he is late to. Medications reviewed, patients meds sent to pharmacy, physical exam WNL, access siste stable strong distal pulse intact, no bleeding no hematoma. Given strict instructions to take his aspirin/plavix today and for 1 year    Case d/w Dr Cisse

## 2017-11-18 NOTE — DISCHARGE NOTE ADULT - CARE PROVIDER_API CALL
Candido Pelayo (RUTHIE), Cardiovascular Disease; Interventional Cardiology; Nuclear Cardiology  [address] [address]  Phone: [phone]  Fax: [phone]

## 2017-11-18 NOTE — DISCHARGE NOTE ADULT - MEDICATION SUMMARY - MEDICATIONS TO TAKE
I will START or STAY ON the medications listed below when I get home from the hospital:    aspirin 81 mg oral delayed release tablet  -- 1 tab(s) by mouth once a day  -- Indication: For STENT take with food    nitroglycerin 0.4 mg sublingual tablet  -- 1 tab(s) under tongue every 5 minutes, As Needed MDD:3 subsequent doses. Belinda ISRAEL when used - for chest pain  -- Indication: For Chest pain    insulin glargine  -- 25 unit(s) injectable once a day (at bedtime) Dispense 30 day supply  -- Indication: For Diabetes    Victoza 18 mg/3 mL subcutaneous solution  --  subcutaneous once a day  -- Indication: For Diabetes    glyburide-metformin 2.5 mg-500 mg oral tablet  -- 2 tab(s) by mouth 2 times a day PLEASE RESUME ON 11/21/17  -- Indication: For Diabetes    atorvastatin 40 mg oral tablet  -- 1 tab(s) by mouth once a day (at bedtime)  -- Indication: For High cholesterol    Plavix 75 mg oral tablet  -- 1 tab(s) by mouth once a day  -- Indication: For High cholesterol    metoprolol succinate 25 mg oral tablet, extended release  -- 1 tab(s) by mouth once a day  -- Indication: For High blood pressure/protects heart

## 2017-11-18 NOTE — DISCHARGE NOTE ADULT - PATIENT PORTAL LINK FT
“You can access the [employer] Patient Portal, offered by [facility], by registering with the following website: [email or web address]”

## 2017-11-18 NOTE — DISCHARGE NOTE ADULT - CARE PLAN
Principal Discharge DX:	CAD (coronary artery disease)  Goal:	Please see Dr Pelayo in 1-2 weeks for a check up. Please return in 4-6 weeks for another stent in one of your blocked arteries  Instructions for follow-up, activity and diet:	You underwent a coronary angiogram and a blockage was opened with a stent to one of the arteries in your heart and should wait 3 days before returning to ordinary activities. Do not drive for 3 days. Consult your doctor before returning to vigorous activity. You may return to work in 3-5 days. The catheter from your groin/wrist was removed and you should remove the dressing in 24 hours. You may shower once the dressing is removed, but avoid baths, hot tubs, or swimming for 5 days to prevent infection. If you notice bleeding from the site, hardening and pain at the site, drainage or redness from the site, coolness/paleness of the extremity, swelling, or fever, please call [phone]. Please continue your aspirin and plavix as prescribed unless otherwise indicated by your cardiologist. All of your prescriptions have been sent to the pharmacy.  Secondary Diagnosis:	HTN (hypertension)  Goal:	Please continue your current medications  Secondary Diagnosis:	DM (diabetes mellitus)  Goal:	Please continue your current medications  Instructions for follow-up, activity and diet:	RESUME YOUR GLYBURIDE/METFORMIN ON 11/21

## 2017-11-18 NOTE — DISCHARGE NOTE ADULT - PLAN OF CARE
Please see Dr Pelayo in 1-2 weeks for a check up. Please return in 4-6 weeks for another stent in one of your blocked arteries You underwent a coronary angiogram and a blockage was opened with a stent to one of the arteries in your heart and should wait 3 days before returning to ordinary activities. Do not drive for 3 days. Consult your doctor before returning to vigorous activity. You may return to work in 3-5 days. The catheter from your groin/wrist was removed and you should remove the dressing in 24 hours. You may shower once the dressing is removed, but avoid baths, hot tubs, or swimming for 5 days to prevent infection. If you notice bleeding from the site, hardening and pain at the site, drainage or redness from the site, coolness/paleness of the extremity, swelling, or fever, please call [phone]. Please continue your aspirin and plavix as prescribed unless otherwise indicated by your cardiologist. All of your prescriptions have been sent to the pharmacy. Please continue your current medications RESUME YOUR GLYBURIDE/METFORMIN ON 11/21

## 2017-11-18 NOTE — DISCHARGE NOTE ADULT - CARE PROVIDERS DIRECT ADDRESSES
,[id number]@Counts include 234 beds at the [facility]
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
## 2017-11-21 DIAGNOSIS — I25.2 OLD MYOCARDIAL INFARCTION: ICD-10-CM

## 2017-11-21 DIAGNOSIS — I10 ESSENTIAL (PRIMARY) HYPERTENSION: ICD-10-CM

## 2017-11-21 DIAGNOSIS — Z82.49 FAMILY HISTORY OF ISCHEMIC HEART DISEASE AND OTHER DISEASES OF THE CIRCULATORY SYSTEM: ICD-10-CM

## 2017-11-21 DIAGNOSIS — Z87.891 PERSONAL HISTORY OF NICOTINE DEPENDENCE: ICD-10-CM

## 2017-11-21 DIAGNOSIS — Z95.5 PRESENCE OF CORONARY ANGIOPLASTY IMPLANT AND GRAFT: ICD-10-CM

## 2017-11-21 DIAGNOSIS — T82.855A STENOSIS OF CORONARY ARTERY STENT, INITIAL ENCOUNTER: ICD-10-CM

## 2017-11-21 DIAGNOSIS — I25.10 ATHEROSCLEROTIC HEART DISEASE OF NATIVE CORONARY ARTERY WITHOUT ANGINA PECTORIS: ICD-10-CM

## 2017-11-21 DIAGNOSIS — E11.9 TYPE 2 DIABETES MELLITUS WITHOUT COMPLICATIONS: ICD-10-CM

## 2017-11-24 PROCEDURE — 85610 PROTHROMBIN TIME: CPT

## 2017-11-24 PROCEDURE — 80076 HEPATIC FUNCTION PANEL: CPT

## 2017-11-24 PROCEDURE — 82550 ASSAY OF CK (CPK): CPT

## 2017-11-24 PROCEDURE — 80061 LIPID PANEL: CPT

## 2017-11-24 PROCEDURE — C1725: CPT

## 2017-11-24 PROCEDURE — 85025 COMPLETE CBC W/AUTO DIFF WBC: CPT

## 2017-11-24 PROCEDURE — C1753: CPT

## 2017-11-24 PROCEDURE — 82553 CREATINE MB FRACTION: CPT

## 2017-11-24 PROCEDURE — 82962 GLUCOSE BLOOD TEST: CPT

## 2017-11-24 PROCEDURE — 83036 HEMOGLOBIN GLYCOSYLATED A1C: CPT

## 2017-11-24 PROCEDURE — C1769: CPT

## 2017-11-24 PROCEDURE — C1887: CPT

## 2017-11-24 PROCEDURE — C1874: CPT

## 2017-11-24 PROCEDURE — 85730 THROMBOPLASTIN TIME PARTIAL: CPT

## 2017-11-24 PROCEDURE — 80053 COMPREHEN METABOLIC PANEL: CPT

## 2017-11-24 PROCEDURE — 93005 ELECTROCARDIOGRAM TRACING: CPT

## 2017-11-24 PROCEDURE — C1724: CPT

## 2021-04-08 NOTE — PATIENT PROFILE ADULT. - HEALTHCARE INFORMATION NEEDED, PROFILE
Stop Farxiga as it is too expensive and p-t had side effects  Glipizide ER 5 mg PO BID #180, R1  Guerline a visit with me and lab in 3 months   none

## 2023-03-27 NOTE — ED ADULT NURSE NOTE - CAS DISCH TRANSFER METHOD
Left  message for patient in regards to appt schedlued on 3/28 needing to be rescheduled due to insurance coverage reasons. Writers call back number provided on vm.  
Private car
